# Patient Record
Sex: FEMALE | Race: BLACK OR AFRICAN AMERICAN | Employment: FULL TIME | URBAN - METROPOLITAN AREA
[De-identification: names, ages, dates, MRNs, and addresses within clinical notes are randomized per-mention and may not be internally consistent; named-entity substitution may affect disease eponyms.]

---

## 2023-01-26 ENCOUNTER — OFFICE VISIT (OUTPATIENT)
Facility: CLINIC | Age: 58
End: 2023-01-26
Payer: COMMERCIAL

## 2023-01-26 VITALS
SYSTOLIC BLOOD PRESSURE: 150 MMHG | DIASTOLIC BLOOD PRESSURE: 88 MMHG | HEIGHT: 63 IN | BODY MASS INDEX: 24.45 KG/M2 | WEIGHT: 138 LBS | OXYGEN SATURATION: 99 % | RESPIRATION RATE: 18 BRPM | HEART RATE: 79 BPM

## 2023-01-26 DIAGNOSIS — R06.09 DOE (DYSPNEA ON EXERTION): Primary | ICD-10-CM

## 2023-01-26 PROCEDURE — 93000 ELECTROCARDIOGRAM COMPLETE: CPT | Performed by: SPECIALIST

## 2023-01-26 PROCEDURE — 99204 OFFICE O/P NEW MOD 45 MIN: CPT | Performed by: SPECIALIST

## 2023-01-26 RX ORDER — SACUBITRIL AND VALSARTAN 49; 51 MG/1; MG/1
1 TABLET, FILM COATED ORAL 2 TIMES DAILY
Qty: 180 TABLET | Refills: 1 | Status: SHIPPED | OUTPATIENT
Start: 2023-01-26

## 2023-01-26 RX ORDER — CARVEDILOL 6.25 MG/1
6.25 TABLET ORAL 2 TIMES DAILY WITH MEALS
Qty: 180 TABLET | Refills: 1 | Status: SHIPPED | OUTPATIENT
Start: 2023-01-26

## 2023-01-26 RX ORDER — FUROSEMIDE 20 MG/1
20 TABLET ORAL
Qty: 90 TABLET | Refills: 1 | Status: SHIPPED | OUTPATIENT
Start: 2023-01-26

## 2023-01-26 RX ORDER — SPIRONOLACTONE 25 MG/1
12.5 TABLET ORAL DAILY
Qty: 45 TABLET | Refills: 1 | Status: SHIPPED | OUTPATIENT
Start: 2023-01-26

## 2023-01-26 RX ORDER — SACUBITRIL AND VALSARTAN 49; 51 MG/1; MG/1
1 TABLET, FILM COATED ORAL 2 TIMES DAILY
COMMUNITY
End: 2023-01-26 | Stop reason: SDUPTHER

## 2023-01-26 RX ORDER — SPIRONOLACTONE 25 MG/1
25 TABLET ORAL DAILY
COMMUNITY
End: 2023-01-26 | Stop reason: SDUPTHER

## 2023-01-26 RX ORDER — FUROSEMIDE 20 MG/1
20 TABLET ORAL DAILY
COMMUNITY
End: 2023-01-26 | Stop reason: SDUPTHER

## 2023-01-26 NOTE — PROGRESS NOTES
Visit Vitals  BP (!) 150/88 (BP 1 Location: Right arm, BP Patient Position: Sitting, BP Cuff Size: Small adult)   Pulse 79   Resp 18   Ht 5' 3\" (1.6 m)   Wt 138 lb (62.6 kg)   SpO2 99%   BMI 24.45 kg/m²

## 2023-01-26 NOTE — PROGRESS NOTES
[address] [facility]                                                            OFFICE NOTE        Edmond Baker M.D.,JOAN Saundra Posey   1965  [id number]    Date/Time:  1/26/202311:28 AM            SUBJECTIVE:  She is completely asymptomatic she feels absolutely fine she tells me she has had no chest pain or shortness of breath or palpitation presyncopal syncopal episodes. She wants to return to work. Assessment/Plan    1. Cardiomyopathy: She seems to be clinically compensated as per patient has seen no evidence of decompensation at this time by physical exam.    Her EKG shows a normal sinus rhythm with LVH and nonspecific ST-T changes. We need to implement guideline directed medical therapy. This has been discussed at length with the patient. Currently taking no medication since she ran out of it and did not have a physician. Start Coreg 6.25 mg twice daily. Start Entresto 49-51 mg p.o. twice daily. Start Aldactone 12.5 mg daily. Start Lasix 20 mg p.o. as needed daily. Proceed with CMP 1 week later. Proceed with echocardiogram as well. She has no AICD despite a very long history of cardiomyopathy. Unclear the reason of why that is. Discussed with her the potential for sudden cardiac death. Once again proceed with echo and she is aware of the potential for AICD need. 2.  Hypertension: Blood pressure is elevated. Restarting above medication as above outlined. Otherwise see her back in 2 to 3 weeks from now. HPI   61years old female with the longstanding past medical history of severe cardiomyopathy apparently for the last 18 years with an ejection fraction of 25 to 30%. She has been living in [facility] and in [place] and recently moved to [place] and [facility] today cardiology follow-up.     On account of multiple moves she has run out of her medication and she has not been taking any of her medication thus far. Past medical history: Previous history of once again cardiomyopathy possible familial her daughter in fact also has a cardiomyopathy and has an LVAD. Also history of hypertension previous history of cocaine abuse but no longer at this time. Past surgical history: Nonsignificant. Social history: She continues to smoke a pack of cigarettes a day denies any recurrent cocaine use. She works as a traveling CNA. Family history: Her daughter has cardiomyopathy as well and has an LVAD. CARDIAC STUDIES                                    EKG Results       Procedure 720 Value Units Date/Time    AMB POC EKG ROUTINE W/ 12 LEADS, INTER & REP [[id number]] Resulted: 01/26/23 1041    Order Status: Completed Updated: 01/26/23 1043                IMAGING      MRI Results (most recent):  No results found for this or any previous visit. CT Results (most recent):  No results found for this or any previous visit. XR Results (most recent):  No results found for this or any previous visit. No past medical history on file. No past surgical history on file. Social History     Tobacco Use    Smoking status: Every Day     Types: Cigarettes     Passive exposure: Current    Smokeless tobacco: Never    Tobacco comments:     10 cig a day    Substance Use Topics    Alcohol use: Yes     Alcohol/week: 2.0 standard drinks     Types: 2 Shots of liquor per week    Drug use: Never     No family history on file.   Allergies   Allergen Reactions    Penicillins Rash         Visit Vitals  BP (!) 150/88 (BP 1 Location: Right arm, BP Patient Position: Sitting, BP Cuff Size: Small adult)   Pulse 79   Resp 18   Ht 5' 3\" (1.6 m)   Wt 138 lb (62.6 kg)   SpO2 99%   BMI 24.45 kg/m²         Last 3 Recorded Weights in this Encounter    01/26/23 1029   Weight: 138 lb (62.6 kg)            Review of Systems:   Pertinent items are noted in the History of Present Illness. Visit Vitals  BP (!) 150/88 (BP 1 Location: Right arm, BP Patient Position: Sitting, BP Cuff Size: Small adult)   Pulse 79   Resp 18   Ht 5' 3\" (1.6 m)   Wt 138 lb (62.6 kg)   SpO2 99%   BMI 24.45 kg/m²     General Appearance:  Well developed, well nourished,alert and oriented x 3, and individual in no acute distress. Ears/Nose/Mouth/Throat:   Hearing grossly normal.         Neck: Supple. Chest:   Lungs clear to auscultation bilaterally. Cardiovascular:  Regular rate and rhythm, S1, S2 normal, no murmur. Abdomen:   Soft, non-tender, bowel sounds are active. Extremities: No edema bilaterally. Skin: Warm and dry. Current Outpatient Medications on File Prior to Visit   Medication Sig Dispense Refill    sacubitriL-valsartan (Entresto) 49-51 mg tab tablet Take 1 Tablet by mouth two (2) times a day. furosemide (Lasix) 20 mg tablet Take 20 mg by mouth daily. spironolactone (ALDACTONE) 25 mg tablet Take 25 mg by mouth daily. No current facility-administered medications on file prior to visit. Nathalie Alamo had no medications administered during this visit. Current Outpatient Medications   Medication Sig    sacubitriL-valsartan (Entresto) 49-51 mg tab tablet Take 1 Tablet by mouth two (2) times a day. furosemide (Lasix) 20 mg tablet Take 20 mg by mouth daily. spironolactone (ALDACTONE) 25 mg tablet Take 25 mg by mouth daily. No current facility-administered medications for this visit.          No results found for: CHOL, CHOLPOCT, CHOLX, CHLST, CHOLV, HDL, HDLPOC, HDLP, LDL, LDLCPOC, LDLC, DLDLP, VLDLC, VLDL, TGLX, TRIGL, TRIGP, TGLPOCT, CHHD, CHHDX    No results found for: NA, K, CL, CO2, AGAP, GLU, BUN, CREA, BUCR, GFRAA, GFRNA, CA, GFRAA    No results found for: ALT, GGT, GGTP, AP, APIT, APX, CBIL, TBIL, TBILI    No results found for: WBC, WBCLT, HGBPOC, HGB, HGBP, HCTPOC, HCT, PHCT, RBCH, PLT, MCV, HGBEXT, HCTEXT, PLTEXT    No results found for: TSH, TSH2, TSH3, TSHP, TSHEXT      No results found for: CHOL, CHOLPOCT, CHOLX, CHLST, CHOLV, HDL, HDLP, LDL, LDLC, DLDLP, TGLX, TRIGL, TRIGP, TGLPOCT, NTGLT, CHHD, CHHDX             Please note that this dictation was completed with [employer], the computer voice recognition software. Quite often unanticipated grammatical, syntax, homophones, and other interpretative errors are inadvertently transcribed by the computer software. Please disregard these errors. Please excuse any errors that have escaped final proofreading.

## 2023-01-26 NOTE — LETTER
NOTIFICATION OF RETURN TO WORK     1/26/2023 11:38 AM    Ms. [address]  . To Whom It May Concern:    Dayron Alvarez is under the care of [address] [place] [address] . She will be able to return to work on 1/26/2023 with no restrictions. If there are questions or concerns please have the patient contact our office.     Sincerely,        Janneth Morgan MD

## 2023-01-26 NOTE — PATIENT INSTRUCTIONS
Please START Coreg 6.25mg twice a day    Please START Entresto 49/51 twice a day    Please START Aldactone 12.5mg daily    Please START Lasix 20mg AS NEEDED for lower extremity swelling    Please have lab work in 1 week    An order has been placed for you for an echocardiogram. Please call to schedule this through [facility] at [phone].     Follow up in 2 weeks
Stable

## 2023-01-27 ENCOUNTER — HOSPITAL ENCOUNTER (OUTPATIENT)
Dept: NON INVASIVE DIAGNOSTICS | Age: 58
Discharge: HOME | End: 2023-01-27
Attending: SPECIALIST
Payer: COMMERCIAL

## 2023-01-27 VITALS
BODY MASS INDEX: 24.45 KG/M2 | DIASTOLIC BLOOD PRESSURE: 88 MMHG | HEIGHT: 63 IN | WEIGHT: 138.01 LBS | SYSTOLIC BLOOD PRESSURE: 150 MMHG

## 2023-01-27 DIAGNOSIS — R06.09 DOE (DYSPNEA ON EXERTION): ICD-10-CM

## 2023-01-27 LAB
ECHO AO ROOT DIAM: 2.6 CM
ECHO AO ROOT INDEX: 1.58 CM/M2
ECHO AV PEAK GRADIENT: 13 MMHG
ECHO AV PEAK VELOCITY: 1.8 M/S
ECHO AV VELOCITY RATIO: 0.44
ECHO EST RA PRESSURE: 3 MMHG
ECHO LA DIAMETER INDEX: 2.42 CM/M2
ECHO LA DIAMETER: 4 CM
ECHO LA TO AORTIC ROOT RATIO: 1.54
ECHO LA VOL 2C: 82 ML (ref 22–52)
ECHO LA VOL 4C: 58 ML (ref 22–52)
ECHO LA VOL BP: 69 ML (ref 22–52)
ECHO LA VOL/BSA BIPLANE: 42 ML/M2 (ref 16–34)
ECHO LA VOLUME AREA LENGTH: 73 ML
ECHO LA VOLUME INDEX A2C: 50 ML/M2 (ref 16–34)
ECHO LA VOLUME INDEX A4C: 35 ML/M2 (ref 16–34)
ECHO LA VOLUME INDEX AREA LENGTH: 44 ML/M2 (ref 16–34)
ECHO LV E' LATERAL VELOCITY: 8 CM/S
ECHO LV E' SEPTAL VELOCITY: 4 CM/S
ECHO LV EDV A2C: 92 ML
ECHO LV EDV A4C: 108 ML
ECHO LV EDV BP: 104 ML (ref 56–104)
ECHO LV EDV INDEX A4C: 65 ML/M2
ECHO LV EDV INDEX BP: 63 ML/M2
ECHO LV EDV NDEX A2C: 56 ML/M2
ECHO LV EJECTION FRACTION A2C: 37 %
ECHO LV EJECTION FRACTION A4C: 30 %
ECHO LV EJECTION FRACTION BIPLANE: 32 % (ref 55–100)
ECHO LV ESV A2C: 58 ML
ECHO LV ESV A4C: 76 ML
ECHO LV ESV BP: 71 ML (ref 19–49)
ECHO LV ESV INDEX A2C: 35 ML/M2
ECHO LV ESV INDEX A4C: 46 ML/M2
ECHO LV ESV INDEX BP: 43 ML/M2
ECHO LV FRACTIONAL SHORTENING: 14 % (ref 28–44)
ECHO LV INTERNAL DIMENSION DIASTOLE INDEX: 3.45 CM/M2
ECHO LV INTERNAL DIMENSION DIASTOLIC: 5.7 CM (ref 3.9–5.3)
ECHO LV INTERNAL DIMENSION SYSTOLIC INDEX: 2.97 CM/M2
ECHO LV INTERNAL DIMENSION SYSTOLIC: 4.9 CM
ECHO LV IVSD: 0.9 CM (ref 0.6–0.9)
ECHO LV MASS 2D: 211.7 G (ref 67–162)
ECHO LV MASS INDEX 2D: 128.3 G/M2 (ref 43–95)
ECHO LV POSTERIOR WALL DIASTOLIC: 1 CM (ref 0.6–0.9)
ECHO LV RELATIVE WALL THICKNESS RATIO: 0.35
ECHO LVOT PEAK GRADIENT: 3 MMHG
ECHO LVOT PEAK VELOCITY: 0.8 M/S
ECHO MV A VELOCITY: 1.17 M/S
ECHO MV AREA PHT: 4 CM2
ECHO MV E DECELERATION TIME (DT): 187.5 MS
ECHO MV E VELOCITY: 0.83 M/S
ECHO MV E/A RATIO: 0.71
ECHO MV E/E' LATERAL: 10.38
ECHO MV E/E' RATIO (AVERAGED): 15.56
ECHO MV E/E' SEPTAL: 20.75
ECHO MV PRESSURE HALF TIME (PHT): 54.4 MS
ECHO MV REGURGITANT ALIASING (NYQUIST) VELOCITY: 25 CM/S
ECHO MV REGURGITANT PEAK GRADIENT: 219 MMHG
ECHO MV REGURGITANT PEAK VELOCITY: 7.4 M/S
ECHO MV REGURGITANT VELOCITY PISA: 7.5 M/S
ECHO MV REGURGITANT VTIA: 239.3 CM
ECHO PV MAX VELOCITY: 1.2 M/S
ECHO PV PEAK GRADIENT: 5 MMHG
ECHO RIGHT VENTRICULAR SYSTOLIC PRESSURE (RVSP): 36 MMHG
ECHO RV TAPSE: 1.8 CM (ref 1.7–?)
ECHO TV REGURGITANT MAX VELOCITY: 2.88 M/S
ECHO TV REGURGITANT PEAK GRADIENT: 33 MMHG

## 2023-01-27 PROCEDURE — 93306 TTE W/DOPPLER COMPLETE: CPT

## 2023-02-08 DIAGNOSIS — R06.09 DOE (DYSPNEA ON EXERTION): ICD-10-CM

## 2023-02-09 ENCOUNTER — OFFICE VISIT (OUTPATIENT)
Facility: CLINIC | Age: 58
End: 2023-02-09
Payer: COMMERCIAL

## 2023-02-09 VITALS
BODY MASS INDEX: 24.1 KG/M2 | HEART RATE: 75 BPM | SYSTOLIC BLOOD PRESSURE: 152 MMHG | DIASTOLIC BLOOD PRESSURE: 90 MMHG | WEIGHT: 136 LBS | OXYGEN SATURATION: 99 % | HEIGHT: 63 IN | RESPIRATION RATE: 16 BRPM

## 2023-02-09 DIAGNOSIS — R06.09 DOE (DYSPNEA ON EXERTION): Primary | ICD-10-CM

## 2023-02-09 NOTE — PATIENT INSTRUCTIONS
Please have lab work done in 2 weeks    Follow up with Dr. Sarmad Alicia in 3 months and have an echocardiogram the same day

## 2023-02-09 NOTE — PROGRESS NOTES
.Visit Vitals  BP (!) 152/90 (BP 1 Location: Right arm, BP Patient Position: Sitting, BP Cuff Size: Adult)   Pulse 75   Resp 16   Ht 5' 3\" (1.6 m)   Wt 136 lb (61.7 kg)   SpO2 99%   BMI 24.09 kg/m²

## 2023-02-09 NOTE — PROGRESS NOTES
[address] [facility]                                                            OFFICE NOTE        Jhonathan Hilton M.D.,JOAN Laurel Milan   1965  [id number]    Date/Time:  1/5/06671:73 AM            SUBJECTIVE:         Assessment/Plan  1. Cardiomyopathy: She seems to be clinically compensated as per patient has seen no evidence of decompensation at this time by physical exam.    Her EKG showed a normal sinus rhythm with LVH and nonspecific ST-T changes. We need to implement guideline directed medical therapy. This has been discussed at length with the patient. Currently taking no medication since she ran out of it and did not have a physician. Medications were over the last office visit but she has not started those yet for some issues with her insurance. She just is received Entresto yesterday. Currently not taking either Coreg and Aldactone. I have asked her to start taking those. Blood pressure log for 2 weeks after she starts all the medication and repeat CMP 2 weeks afterwards. She has no AICD despite a very long history of cardiomyopathy. Unclear the reason of why that is. Discussed with her the potential for sudden cardiac death. We have discussed results of the echocardiogram done recently January 2023 with an ejection fraction of 30 to 35%. Once again I restart guideline directed medical therapy of asked her to be compliant with medications since this may reset time for potential AICD placement. Once again proceed with echo again in 3 months and she is aware of the potential for AICD need. 2.  Hypertension: Blood pressure is elevated. Restarting above medication as above outlined. Blood pressure log for 2 weeks after she is back on Coreg Entresto and Aldactone.     Otherwise see her back in 3 months with limited echo the same day      HPI   61years old female with the longstanding past medical history of severe cardiomyopathy apparently for the last 18 years with an ejection fraction of 25 to 30%. She has been living in [place] and in [place] and recently moved to [facility] and establishing today cardiology follow-up. On account of multiple moves she has run out of her medication and she has not been taking any of her medication thus far. Past medical history: Previous history of once again cardiomyopathy possible familial her daughter in fact also has a cardiomyopathy and has an LVAD. Also history of hypertension previous history of cocaine abuse but no longer at this time. Past surgical history: Nonsignificant. Social history: She continues to smoke a pack of cigarettes a day denies any recurrent cocaine use. She works as a traveling CNA. Family history: Her daughter has cardiomyopathy as well and has an LVAD. CARDIAC STUDIES        01/27/23    ECHO ADULT COMPLETE 01/27/2023 1/27/2023    Interpretation Summary    Left Ventricle: Severely reduced left ventricular systolic function with a visually estimated EF of 30 - 35%. Left ventricle is mildly dilated. Mildly increased wall thickness. Findings consistent with mild concentric hypertrophy. Severe global hypokinesis present. Grade II diastolic dysfunction with increased LAP. Mitral Valve: Moderate regurgitation. Left Atrium: Left atrium is mildly dilated. Signed by: Vipul Zelaya MD on 1/27/2023  5:36 PM                              EKG Results       None                IMAGING      MRI Results (most recent):  No results found for this or any previous visit. CT Results (most recent):  No results found for this or any previous visit. XR Results (most recent):  No results found for this or any previous visit. No past medical history on file. No past surgical history on file.   Social History     Tobacco Use    Smoking status: Every Day     Types: Cigarettes     Passive exposure: Current    Smokeless tobacco: Never    Tobacco comments:     10 cig a day    Substance Use Topics    Alcohol use: Yes     Alcohol/week: 2.0 standard drinks     Types: 2 Shots of liquor per week    Drug use: Never     No family history on file. Allergies   Allergen Reactions    Penicillins Rash         There were no vitals taken for this visit. There were no vitals filed for this visit. Review of Systems:   Pertinent items are noted in the History of Present Illness. Current Outpatient Medications on File Prior to Visit   Medication Sig Dispense Refill    carvediloL (COREG) 6.25 mg tablet Take 1 Tablet by mouth two (2) times daily (with meals). 180 Tablet 1    sacubitriL-valsartan (Entresto) 49-51 mg tab tablet Take 1 Tablet by mouth two (2) times a day. 180 Tablet 1    furosemide (Lasix) 20 mg tablet Take 1 Tablet by mouth daily as needed (lower extremity swelling). 90 Tablet 1    spironolactone (ALDACTONE) 25 mg tablet Take 0.5 Tablets by mouth daily. 45 Tablet 1     No current facility-administered medications on file prior to visit. Sharon Weir had no medications administered during this visit. Current Outpatient Medications   Medication Sig    carvediloL (COREG) 6.25 mg tablet Take 1 Tablet by mouth two (2) times daily (with meals). sacubitriL-valsartan (Entresto) 49-51 mg tab tablet Take 1 Tablet by mouth two (2) times a day. furosemide (Lasix) 20 mg tablet Take 1 Tablet by mouth daily as needed (lower extremity swelling). spironolactone (ALDACTONE) 25 mg tablet Take 0.5 Tablets by mouth daily. No current facility-administered medications for this visit.          No results found for: CHOL, CHOLPOCT, CHOLX, CHLST, CHOLV, HDL, HDLPOC, HDLP, LDL, LDLCPOC, LDLC, DLDLP, VLDLC, VLDL, TGLX, TRIGL, TRIGP, TGLPOCT, CHHD, CHHDX    No results found for: NA, K, CL, CO2, AGAP, GLU, BUN, CREA, BUCR, GFRAA, GFRNA, CA, GFRAA    No results found for: ALT, GGT, GGTP, AP, APIT, APX, CBIL, TBIL, TBILI    No results found for: WBC, WBCLT, HGBPOC, HGB, HGBP, HCTPOC, HCT, PHCT, RBCH, PLT, MCV, HGBEXT, HCTEXT, PLTEXT    No results found for: TSH, TSH2, TSH3, TSHP, TSHEXT      No results found for: CHOL, CHOLPOCT, CHOLX, CHLST, CHOLV, HDL, HDLP, LDL, LDLC, DLDLP, TGLX, TRIGL, TRIGP, TGLPOCT, NTGLT, CHHD, CHHDX             Please note that this dictation was completed with [employer], the [employer] voice recognition software. Quite often unanticipated grammatical, syntax, homophones, and other interpretative errors are inadvertently transcribed by the computer software. Please disregard these errors. Please excuse any errors that have escaped final proofreading.

## 2023-02-10 LAB
ALBUMIN SERPL-MCNC: 3.9 G/DL (ref 3.5–5)
ALBUMIN/GLOB SERPL: 1.1 (ref 1.1–2.2)
ALP SERPL-CCNC: 73 U/L (ref 45–117)
ALT SERPL-CCNC: 104 U/L (ref 12–78)
ANION GAP SERPL CALC-SCNC: 8 MMOL/L (ref 5–15)
AST SERPL-CCNC: 62 U/L (ref 15–37)
BILIRUB SERPL-MCNC: 0.3 MG/DL (ref 0.2–1)
BUN SERPL-MCNC: 16 MG/DL (ref 6–20)
BUN/CREAT SERPL: 15 (ref 12–20)
CALCIUM SERPL-MCNC: 9.2 MG/DL (ref 8.5–10.1)
CHLORIDE SERPL-SCNC: 109 MMOL/L (ref 97–108)
CO2 SERPL-SCNC: 23 MMOL/L (ref 21–32)
CREAT SERPL-MCNC: 1.09 MG/DL (ref 0.55–1.02)
GLOBULIN SER CALC-MCNC: 3.5 G/DL (ref 2–4)
GLUCOSE SERPL-MCNC: 91 MG/DL (ref 65–100)
POTASSIUM SERPL-SCNC: 3.9 MMOL/L (ref 3.5–5.1)
PROT SERPL-MCNC: 7.4 G/DL (ref 6.4–8.2)
SODIUM SERPL-SCNC: 140 MMOL/L (ref 136–145)

## 2023-02-15 ENCOUNTER — PATIENT MESSAGE (OUTPATIENT)
Dept: CARDIOLOGY CLINIC | Age: 58
End: 2023-02-15

## 2023-02-15 NOTE — TELEPHONE ENCOUNTER
MD Duke Epps, RN  Bmp ok continue rx     Future Appointments   Date Time Provider Leanne Thu   3/29/2023 10:30 AM DO RANDY Turner BS AMB   5/30/2023  8:00 AM MARK ALFARO BS AMB   5/30/2023  9:20 AM MD LU Epps BS AMB